# Patient Record
Sex: FEMALE | Race: WHITE | NOT HISPANIC OR LATINO | Employment: STUDENT | URBAN - METROPOLITAN AREA
[De-identification: names, ages, dates, MRNs, and addresses within clinical notes are randomized per-mention and may not be internally consistent; named-entity substitution may affect disease eponyms.]

---

## 2024-07-15 ENCOUNTER — OFFICE VISIT (OUTPATIENT)
Dept: FAMILY MEDICINE CLINIC | Facility: CLINIC | Age: 24
End: 2024-07-15
Payer: COMMERCIAL

## 2024-07-15 VITALS
OXYGEN SATURATION: 99 % | BODY MASS INDEX: 41.02 KG/M2 | DIASTOLIC BLOOD PRESSURE: 82 MMHG | WEIGHT: 293 LBS | SYSTOLIC BLOOD PRESSURE: 132 MMHG | HEIGHT: 71 IN | HEART RATE: 91 BPM

## 2024-07-15 DIAGNOSIS — Z13.220 LIPID SCREENING: ICD-10-CM

## 2024-07-15 DIAGNOSIS — Z83.3 FAMILY HISTORY OF DIABETES MELLITUS: ICD-10-CM

## 2024-07-15 DIAGNOSIS — F84.0 AUTISM DISORDER: ICD-10-CM

## 2024-07-15 DIAGNOSIS — Z00.00 WELL ADULT EXAM: Primary | ICD-10-CM

## 2024-07-15 DIAGNOSIS — E66.01 SEVERE OBESITY (BMI >= 40): ICD-10-CM

## 2024-07-15 DIAGNOSIS — Z11.59 NEED FOR HEPATITIS C SCREENING TEST: ICD-10-CM

## 2024-07-15 DIAGNOSIS — F33.42 MAJOR DEPRESSIVE DISORDER, RECURRENT, IN FULL REMISSION: ICD-10-CM

## 2024-07-15 DIAGNOSIS — R63.5 WEIGHT GAIN: ICD-10-CM

## 2024-07-15 DIAGNOSIS — F90.0 ATTENTION DEFICIT HYPERACTIVITY DISORDER (ADHD), PREDOMINANTLY INATTENTIVE TYPE: ICD-10-CM

## 2024-07-15 PROCEDURE — 99385 PREV VISIT NEW AGE 18-39: CPT | Performed by: FAMILY MEDICINE

## 2024-07-15 RX ORDER — ARIPIPRAZOLE 2 MG/1
1 TABLET ORAL DAILY
COMMUNITY
Start: 2024-04-10

## 2024-07-15 RX ORDER — DEXTROAMPHETAMINE SACCHARATE, AMPHETAMINE ASPARTATE MONOHYDRATE, DEXTROAMPHETAMINE SULFATE AND AMPHETAMINE SULFATE 7.5; 7.5; 7.5; 7.5 MG/1; MG/1; MG/1; MG/1
30 CAPSULE, EXTENDED RELEASE ORAL EVERY MORNING
COMMUNITY
Start: 2024-05-08

## 2024-07-15 RX ORDER — ESCITALOPRAM OXALATE 20 MG/1
20 TABLET ORAL DAILY
COMMUNITY

## 2024-07-15 RX ORDER — DEXTROAMPHETAMINE SACCHARATE, AMPHETAMINE ASPARTATE, DEXTROAMPHETAMINE SULFATE AND AMPHETAMINE SULFATE 2.5; 2.5; 2.5; 2.5 MG/1; MG/1; MG/1; MG/1
TABLET ORAL
COMMUNITY
Start: 2024-05-08

## 2024-07-15 NOTE — PATIENT INSTRUCTIONS
Continue your current medications.   Aim for 150 minutes of aerobic exercise weekly.  You had lab tests today. You should receive a call or my chart message with your test results. If you have not received your results in the next 7-10 days, please contact the office.    Encourage pap smear every 1-3 years with gynecologist.  Follow up with psychiatrist every 3 months as scheduled.

## 2024-07-15 NOTE — PROGRESS NOTES
Chief Complaint  Weight Check (Weight gain, family history of diabetes)    Subjective    History of Present Illness {CC  Problem List  Visit  Diagnosis   Encounters  Notes  Medications  Labs  Result Review Imaging  Media :23}     Claudy Cervantes presents to [facility] for Weight Check (Weight gain, family history of diabetes).  She is here for well exam and to reestablish.  She was last seen in February 2021.  She saw the gynecologist about 2 years ago and had pap smear which was normal. She works full time as an author. She has never been a smoker. She exercises infrequently. She is overdue dental and eye exams.     Their chronic medical conditions were reviewed and are stable unless noted otherwise below.  She has a history of ADHD, autism and depression. She is seeing psychiatrist and is currently stable on abilify, adderall, and lexapro.  She has been on these medications for more than a year.    She complains of gradual weight gain and family is concerned about her sugar as she has a family history of diabetes.  She denies any polyuria, polydipsia, or increased fatigue.    History of Present Illness     Social History     Socioeconomic History    Marital status: Single   Tobacco Use    Smoking status: Never    Smokeless tobacco: Never   Vaping Use    Vaping status: Never Used        Review of Systems   Constitutional:  Negative for fatigue.   HENT:  Negative for ear pain and sore throat.    Eyes:  Negative for pain and visual disturbance.   Respiratory:  Negative for cough and shortness of breath.    Cardiovascular:  Negative for chest pain and palpitations.   Gastrointestinal:  Positive for nausea (intermittent in morning). Negative for abdominal pain, constipation and diarrhea.   Genitourinary:  Negative for dysuria.   Musculoskeletal:  Negative for arthralgias.   Skin:  Negative for color change and pallor.   Allergic/Immunologic: Positive for environmental allergies  "(mild seasonal, uses otc antihistamine as needed).   Neurological:  Negative for dizziness and headaches.   Hematological:  Negative for adenopathy.   Psychiatric/Behavioral:  Negative for suicidal ideas.         Objective       Vital Signs:   /82   Pulse 91   Ht 180.3 cm (71\")   Wt (!) 146 kg (321 lb 9.6 oz)   SpO2 99%   BMI 44.85 kg/m²     Body mass index is 44.85 kg/m².     PHQ-9 Depression Screening  Little interest or pleasure in doing things? 0-->not at all   Feeling down, depressed, or hopeless? 0-->not at all   Trouble falling or staying asleep, or sleeping too much?     Feeling tired or having little energy?     Poor appetite or overeating?     Feeling bad about yourself - or that you are a failure or have let yourself or your family down?     Trouble concentrating on things, such as reading the newspaper or watching television?     Moving or speaking so slowly that other people could have noticed? Or the opposite - being so fidgety or restless that you have been moving around a lot more than usual?     Thoughts that you would be better off dead, or of hurting yourself in some way?     PHQ-9 Total Score 0   If you checked off any problems, how difficult have these problems made it for you to do your work, take care of things at home, or get along with other people?           Physical Exam  Constitutional:       General: She is not in acute distress.     Appearance: Normal appearance.   HENT:      Head: Normocephalic and atraumatic.      Right Ear: Tympanic membrane normal.      Left Ear: Tympanic membrane normal.      Nose: No rhinorrhea.      Mouth/Throat:      Mouth: Mucous membranes are moist.      Pharynx: No posterior oropharyngeal erythema.   Eyes:      Conjunctiva/sclera: Conjunctivae normal.   Cardiovascular:      Rate and Rhythm: Normal rate and regular rhythm.      Pulses: Normal pulses.      Heart sounds: No murmur heard.  Pulmonary:      Effort: No respiratory distress.      Breath " sounds: Normal breath sounds.   Abdominal:      General: There is no distension.      Palpations: Abdomen is soft.      Tenderness: There is no abdominal tenderness.   Musculoskeletal:         General: Normal range of motion.      Right lower leg: No edema.      Left lower leg: No edema.   Lymphadenopathy:      Cervical: No cervical adenopathy.   Skin:     General: Skin is warm.      Findings: No lesion.   Neurological:      General: No focal deficit present.   Psychiatric:         Behavior: Behavior normal.          Result Review  Data Reviewed:{ Labs  Result Review  Imaging  Med Tab  Media :23}                Assessment and Plan {CC Problem List  Visit Diagnosis  ROS  Review (Popup)  [facility] Maintenance  Quality  BestPractice  Medications  SmartSets  SnapShot Encounters  Media :23}   Diagnoses and all orders for this visit:    1. Well adult exam (Primary)    2. Severe obesity (BMI >= 40)  -     TSH Rfx On Abnormal To Free T4    3. Family history of diabetes mellitus  -     Comprehensive Metabolic Panel  -     Hemoglobin A1c    4. Weight gain  -     TSH Rfx On Abnormal To Free T4    5. Attention deficit hyperactivity disorder (ADHD), predominantly inattentive type    6. Autism disorder    7. Major depressive disorder, recurrent, in full remission    8. Need for hepatitis C screening test  -     Hepatitis C Antibody    9. Lipid screening  -     Lipid Panel        Patient Instructions   Continue your current medications.   Aim for 150 minutes of aerobic exercise weekly.  You had lab tests today. You should receive a call or my chart message with your test results. If you have not received your results in the next 7-10 days, please contact the office.    Encourage pap smear every 1-3 years with gynecologist.  Follow up with psychiatrist every 3 months as scheduled.     Routine health maintenance, immunizations, and cancer screenings were discussed and encouraged.  We also discussed focusing on improving  diet and activity level on a day-to-day basis.    Patient was given instructions and counseling regarding her condition or for health maintenance advice on the AVS.       No follow-ups on file.    Lou Woods MD

## 2024-07-16 LAB
ALBUMIN SERPL-MCNC: 4.3 G/DL (ref 4–5)
ALP SERPL-CCNC: 77 IU/L (ref 44–121)
ALT SERPL-CCNC: 13 IU/L (ref 0–32)
AST SERPL-CCNC: 8 IU/L (ref 0–40)
BILIRUB SERPL-MCNC: <0.2 MG/DL (ref 0–1.2)
BUN SERPL-MCNC: 12 MG/DL (ref 6–20)
BUN/CREAT SERPL: 17 (ref 9–23)
CALCIUM SERPL-MCNC: 9.6 MG/DL (ref 8.7–10.2)
CHLORIDE SERPL-SCNC: 106 MMOL/L (ref 96–106)
CHOLEST SERPL-MCNC: 157 MG/DL (ref 100–199)
CO2 SERPL-SCNC: 22 MMOL/L (ref 20–29)
CREAT SERPL-MCNC: 0.69 MG/DL (ref 0.57–1)
EGFRCR SERPLBLD CKD-EPI 2021: 123 ML/MIN/1.73
GLOBULIN SER CALC-MCNC: 2.8 G/DL (ref 1.5–4.5)
GLUCOSE SERPL-MCNC: 82 MG/DL (ref 70–99)
HBA1C MFR BLD: 5.7 % (ref 4.8–5.6)
HCV IGG SERPL QL IA: NON REACTIVE
HDLC SERPL-MCNC: 47 MG/DL
LDLC SERPL CALC-MCNC: 82 MG/DL (ref 0–99)
POTASSIUM SERPL-SCNC: 4.7 MMOL/L (ref 3.5–5.2)
PROT SERPL-MCNC: 7.1 G/DL (ref 6–8.5)
SODIUM SERPL-SCNC: 140 MMOL/L (ref 134–144)
TRIGL SERPL-MCNC: 160 MG/DL (ref 0–149)
TSH SERPL DL<=0.005 MIU/L-ACNC: 2.25 UIU/ML (ref 0.45–4.5)
VLDLC SERPL CALC-MCNC: 28 MG/DL (ref 5–40)

## 2025-03-28 ENCOUNTER — OFFICE VISIT (OUTPATIENT)
Dept: FAMILY MEDICINE CLINIC | Facility: CLINIC | Age: 25
End: 2025-03-28
Payer: MEDICAID

## 2025-03-28 ENCOUNTER — HOSPITAL ENCOUNTER (OUTPATIENT)
Dept: GENERAL RADIOLOGY | Facility: HOSPITAL | Age: 25
Discharge: HOME | End: 2025-03-28
Payer: MEDICAID

## 2025-03-28 VITALS
BODY MASS INDEX: 41.02 KG/M2 | SYSTOLIC BLOOD PRESSURE: 120 MMHG | HEART RATE: 91 BPM | OXYGEN SATURATION: 98 % | DIASTOLIC BLOOD PRESSURE: 83 MMHG | TEMPERATURE: 98.1 F | WEIGHT: 293 LBS | HEIGHT: 71 IN

## 2025-03-28 DIAGNOSIS — J06.9 VIRAL UPPER RESPIRATORY TRACT INFECTION: ICD-10-CM

## 2025-03-28 DIAGNOSIS — J06.9 VIRAL UPPER RESPIRATORY TRACT INFECTION: Primary | ICD-10-CM

## 2025-03-28 PROCEDURE — 99214 OFFICE O/P EST MOD 30 MIN: CPT | Performed by: FAMILY MEDICINE

## 2025-03-28 PROCEDURE — 71046 X-RAY EXAM CHEST 2 VIEWS: CPT

## 2025-03-28 RX ORDER — ALBUTEROL SULFATE AND BUDESONIDE 90; 80 UG/1; UG/1
2 AEROSOL, METERED RESPIRATORY (INHALATION) EVERY 6 HOURS PRN
Qty: 10.7 G | Refills: 0 | Status: SHIPPED | OUTPATIENT
Start: 2025-03-28

## 2025-03-28 RX ORDER — ALBUTEROL SULFATE 90 UG/1
2 INHALANT RESPIRATORY (INHALATION) EVERY 4 HOURS PRN
Qty: 18 G | Refills: 0 | Status: SHIPPED | OUTPATIENT
Start: 2025-03-28

## 2025-03-28 NOTE — PROGRESS NOTES
"Chief Complaint  Cough (SOA, headaches, not sleeping well )    Subjective        Claudy Cervantes presents to [facility]       The patient is a 25-year-old female who presents to the clinic for follow-up on shortness of breath and cough following a COVID-19 infection 3 weeks ago.    She has been experiencing difficulty in achieving full deep breaths, which is more pronounced at night, leading to sleep disturbances. She reports no current fevers or chills. She also reports no chest pain. She has no history of asthma but reports wheezing associated with her shortness of breath. She has been managing her cough with Tessalon Perles, which provided partial relief, but she has exhausted her supply. She had severe seasonal allergies during her childhood, which have significantly improved over the past 5 to 10 years.    FAMILY HISTORY  Her father has asthma.    MEDICATIONS  Tessalon perles    Cough        Objective   Vital Signs:  /83   Pulse 91   Temp 98.1 °F (36.7 °C)   Ht 180.3 cm (70.98\")   Wt (!) 139 kg (307 lb)   SpO2 98%   BMI 42.84 kg/m²   Estimated body mass index is 42.84 kg/m² as calculated from the following:    Height as of this encounter: 180.3 cm (70.98\").    Weight as of this encounter: 139 kg (307 lb).            Physical Exam  Constitutional:       Appearance: Normal appearance.   HENT:      Head: Normocephalic and atraumatic.      Nose: Nose normal.      Mouth/Throat:      Mouth: Mucous membranes are moist.   Eyes:      General: Lids are normal.      Conjunctiva/sclera: Conjunctivae normal.   Cardiovascular:      Rate and Rhythm: Normal rate and regular rhythm.      Heart sounds: Normal heart sounds.   Pulmonary:      Effort: Pulmonary effort is normal.      Breath sounds: Wheezing and rales present.   Musculoskeletal:      Cervical back: Normal range of motion.   Skin:     General: Skin is warm and dry.   Neurological:      General: No focal deficit present.      " Mental Status: She is alert and oriented to person, place, and time.      Gait: Gait is intact.   Psychiatric:         Mood and Affect: Mood normal.         Behavior: Behavior normal.         Thought Content: Thought content normal.        Result Review :               Results         Assessment and Plan        1. Post-COVID-19 respiratory symptoms.  She reports persistent shortness of breath and cough following a COVID-19 infection 3 weeks ago. Wheezing is present upon examination. Oxygen saturation levels are within normal limits, and there is no tachycardia. A chest x-ray has been ordered to rule out pneumonia. An inhaler has been prescribed for use every 4 to 6 hours as needed. If there is no improvement by Monday, a follow-up appointment will be scheduled, and the initiation of steroid therapy will be considered. If her condition deteriorates over the weekend, characterized by new onset of fever, increased shortness of breath, or chills, she is advised to seek immediate medical attention at the emergency room.    Diagnoses and all orders for this visit:    1. Viral upper respiratory tract infection (Primary)  -     Cancel: POCT SARS-CoV-2 Antigen RHONDA + Flu  -     XR Chest PA & Lateral; Future    Other orders  -     Albuterol-Budesonide (Airsupra) 90-80 MCG/ACT aerosol; Inhale 2 puffs Every 6 (Six) Hours As Needed (Cough or wheezing).  Dispense: 10.7 g; Refill: 0             Follow Up   No follow-ups on file.  Patient was given instructions and counseling regarding her condition or for health maintenance advice. Please see specific information pulled into the AVS if appropriate.     Patient or patient representative verbalized consent for the use of Ambient Listening during the visit with  Chayito Ji MD for chart documentation. 3/28/2025  13:18 EDT

## 2025-04-02 RX ORDER — METHYLPREDNISOLONE 4 MG/1
TABLET ORAL
Qty: 21 TABLET | Refills: 0 | Status: SHIPPED | OUTPATIENT
Start: 2025-04-02

## 2025-04-29 ENCOUNTER — OFFICE VISIT (OUTPATIENT)
Dept: FAMILY MEDICINE CLINIC | Facility: CLINIC | Age: 25
End: 2025-04-29
Payer: MEDICAID

## 2025-04-29 VITALS
HEART RATE: 102 BPM | SYSTOLIC BLOOD PRESSURE: 125 MMHG | DIASTOLIC BLOOD PRESSURE: 81 MMHG | OXYGEN SATURATION: 96 % | WEIGHT: 293 LBS | HEIGHT: 71 IN | BODY MASS INDEX: 41.02 KG/M2

## 2025-04-29 DIAGNOSIS — J98.01 BRONCHOSPASM: Primary | ICD-10-CM

## 2025-04-29 PROCEDURE — 1159F MED LIST DOCD IN RCRD: CPT | Performed by: FAMILY MEDICINE

## 2025-04-29 PROCEDURE — 1160F RVW MEDS BY RX/DR IN RCRD: CPT | Performed by: FAMILY MEDICINE

## 2025-04-29 PROCEDURE — 99214 OFFICE O/P EST MOD 30 MIN: CPT | Performed by: FAMILY MEDICINE

## 2025-04-29 RX ORDER — MOMETASONE FUROATE AND FORMOTEROL FUMARATE DIHYDRATE 100; 5 UG/1; UG/1
2 AEROSOL RESPIRATORY (INHALATION)
Qty: 13 G | Refills: 2 | Status: SHIPPED | OUTPATIENT
Start: 2025-04-29

## 2025-04-29 RX ORDER — MONTELUKAST SODIUM 10 MG/1
10 TABLET ORAL NIGHTLY
Qty: 30 TABLET | Refills: 2 | Status: SHIPPED | OUTPATIENT
Start: 2025-04-29

## 2025-04-29 RX ORDER — ALBUTEROL SULFATE 90 UG/1
2 INHALANT RESPIRATORY (INHALATION) EVERY 4 HOURS PRN
Qty: 18 G | Refills: 1 | Status: SHIPPED | OUTPATIENT
Start: 2025-04-29

## 2025-04-29 RX ORDER — METHYLPREDNISOLONE 4 MG/1
TABLET ORAL
Qty: 21 TABLET | Refills: 0 | Status: SHIPPED | OUTPATIENT
Start: 2025-04-29

## 2025-04-29 NOTE — PATIENT INSTRUCTIONS
Take steroid pack as directed.  Add dulera and singulair routinely for the next few months.  Use the albuterol as needed.   If symptoms persist despite above, plan allergist or pulmonologist evaluation.

## 2025-04-29 NOTE — PROGRESS NOTES
"Chief Complaint  Shortness of Breath (SOA since covid, steroids helped )    Subjective    History of Present Illness {CC  Problem List  Visit  Diagnosis   Encounters  Notes  Medications  Labs  Result Review Imaging  Media :23}     Claudy Cervantes presents to [facility] for Shortness of Breath (SOA since covid, steroids helped ).  History of Present Illness     She presents with ongoing cough and wheezing since had covid 2 months ago. She was given steroid pack and did well while on steroid but symptoms recurred after stopping. She is currently on albuterol but symptoms persist despite this. Her cough and wheezing are now worsening. She has allergies but no prior history of asthma and has never been a smoker. She had chest xray last month that was normal.     Objective     Vital Signs:   /81   Pulse 102   Ht 180.3 cm (71\")   Wt (!) 139 kg (306 lb 8 oz)   SpO2 96%   BMI 42.75 kg/m²   Body mass index is 42.75 kg/m².     Physical Exam  Constitutional:       General: She is not in acute distress.  Cardiovascular:      Rate and Rhythm: Normal rate and regular rhythm.   Pulmonary:      Effort: No respiratory distress.      Breath sounds: Wheezing (diffuse inspiratory and expiratory) present. No rhonchi.          Result Review  Data Reviewed:{ Labs  Result Review  Imaging  Med Tab  Media :23}                Assessment and Plan {CC Problem List  Visit Diagnosis  ROS  Review (Popup)  Health Maintenance  Quality  BestPractice  Medications  SmartSets  SnapShot Encounters  Media :23}   Diagnoses and all orders for this visit:    1. Bronchospasm (Primary)    Other orders  -     methylPREDNISolone (MEDROL) 4 MG dose pack; Take as directed on package instructions.  Dispense: 21 tablet; Refill: 0  -     mometasone-formoterol (Dulera) 100-5 MCG/ACT inhaler; Inhale 2 puffs 2 (Two) Times a Day.  Dispense: 13 g; Refill: 2  -     montelukast (Singulair) 10 MG tablet; Take 1 " tablet by mouth Every Night.  Dispense: 30 tablet; Refill: 2  -     albuterol sulfate  (90 Base) MCG/ACT inhaler; Inhale 2 puffs Every 4 (Four) Hours As Needed for Wheezing or Shortness of Air.  Dispense: 18 g; Refill: 1        Patient Instructions   Take steroid pack as directed.  Add dulera and singulair routinely for the next few months.  Use the albuterol as needed.   If symptoms persist despite above, plan allergist or pulmonologist evaluation.      Patient was given instructions and counseling regarding her condition or for health maintenance advice on the AVS.       Return in about 3 months (around 7/29/2025) for Annual physical.    Lou Woods MD